# Patient Record
Sex: FEMALE | Race: WHITE | NOT HISPANIC OR LATINO | URBAN - METROPOLITAN AREA
[De-identification: names, ages, dates, MRNs, and addresses within clinical notes are randomized per-mention and may not be internally consistent; named-entity substitution may affect disease eponyms.]

---

## 2022-04-29 ENCOUNTER — EMERGENCY (EMERGENCY)
Facility: HOSPITAL | Age: 57
LOS: 0 days | Discharge: HOME | End: 2022-04-29
Attending: EMERGENCY MEDICINE | Admitting: EMERGENCY MEDICINE
Payer: COMMERCIAL

## 2022-04-29 VITALS
HEIGHT: 62 IN | DIASTOLIC BLOOD PRESSURE: 60 MMHG | OXYGEN SATURATION: 100 % | RESPIRATION RATE: 18 BRPM | HEART RATE: 92 BPM | SYSTOLIC BLOOD PRESSURE: 133 MMHG | WEIGHT: 139.99 LBS | TEMPERATURE: 98 F

## 2022-04-29 DIAGNOSIS — F41.0 PANIC DISORDER [EPISODIC PAROXYSMAL ANXIETY]: ICD-10-CM

## 2022-04-29 PROCEDURE — 99283 EMERGENCY DEPT VISIT LOW MDM: CPT

## 2022-04-29 NOTE — ED PROVIDER NOTE - NS ED ROS FT
Constitutional: (-) fever (-) malaise (-) diaphoresis (-) chills  Eyes: (-) visual changes   Cardiac: (-) chest pain  (-) palpitations (-) syncope (-) edema  Respiratory: (-) SOB (-) MAY  GI: (-) nausea (-) vomiting (-) diarrhea (-) abdominal pain  Neuro: (-) headache (-) dizziness (-) numbness/tingling to extremities B/L (-) weakness   Skin: (-) rash (-) laceration  Psychiatric: (-) hallucinations (-) SI/HI (-) intoxication    Except as documented in the HPI, all other systems are negative.

## 2022-04-29 NOTE — ED PROVIDER NOTE - PHYSICAL EXAMINATION
GENERAL: Well-nourished, Well-developed. NAD.  HEAD: No visible or palpable bumps or hematomas. No ecchymosis behind ears B/L.  Eyes: PERRLA, EOMI. No asymmetry. No nystagmus. No conjunctival injection. Non-icteric sclera.  ENMT: MMM.   Neck: Supple. FROM  CVS: Normal S1,S2. No murmurs appreciated on auscultation   RESP: Lungs clear to auscultation B/L. No wheezing, rales, or rhonchi auscultated.  Skin: Warm, Dry. No rashes or lesions. Good cap refill < 2 sec B/L.  EXT: Radial and pedal pulses present B/L. No calf tenderness or swelling B/L. No palpable cords. No pedal edema B/L.  Neuro: AA&O x 3. Sensation grossly intact. Strength 5/5 B/L. Gait within normal limits.

## 2022-04-29 NOTE — ED ADULT TRIAGE NOTE - CHIEF COMPLAINT QUOTE
pt states "I saw a child having a seizure on the bus and I thought he was going to die. I'm shaking"

## 2022-04-29 NOTE — ED PROVIDER NOTE - NSFOLLOWUPCLINICS_GEN_ALL_ED_FT
[facility]  [facility]  [address] [place] [id number]  Phone: [phone]  Fax:   Follow Up Time: 4-6 Days

## 2022-04-29 NOTE — ED PROVIDER NOTE - OBJECTIVE STATEMENT
57 yo F pmhx anxiety (takes 1mg xanax as needed) presenting to the ED for evaluation of anxiety, pt is  and on way to school one of the students had a seizure, pt states she was trying to help the child who was having a seizure and after witnessing that seizure feels "shaken up". Pt has no medical complaint, just reports she felt very upset and overwhelmed. Pt states she took 0.5mg of xanax this am. Denies any si/hi, hallucinations, chest pain, sob, vomiting.

## 2022-04-29 NOTE — ED PROVIDER NOTE - NSFOLLOWUPINSTRUCTIONS_ED_ALL_ED_FT
Anxiety    Generalized anxiety disorder (GOPI) is a mental disorder. It is defined as anxiety that is not necessarily related to specific events or activities or is out of proportion to said events. Symptoms include restlessness, fatigue, difficulty concentrations, irritability and difficulty concentrating. It interferes with life functions, including relationships, work, and school. If you were started on a medication make sure to take exactly as prescribed and follow up with a psychiatrist.    SEEK IMMEDIATE MEDICAL CARE IF YOU HAVE THE FOLLOWING SYMPTOMS: thoughts about hurting killing yourself, thoughts about hurting or killing somebody else, hallucinations, or worsening depression. Panic Attack  A panic attack is a sudden episode of severe anxiety, fear, or discomfort that causes physical and emotional symptoms. The attack may be in response to something frightening, or it may occur for no known reason.    Symptoms of a panic attack can be similar to symptoms of a heart attack or stroke. It is important to see your health care provider when you have a panic attack so that these conditions can be ruled out.    A panic attack is a symptom of another condition. Most panic attacks go away with treatment of the underlying problem. If you have panic attacks often, you may have a condition called panic disorder.    What are the causes?  A panic attack may be caused by:    An extreme, life-threatening situation, such as a war or natural disaster.  An anxiety disorder, such as post-traumatic stress disorder.  Depression.  Certain medical conditions, including heart problems, neurological conditions, and infections.  Certain over-the-counter and prescription medicines.  Illegal drugs that increase heart rate and blood pressure, such as methamphetamine.  Alcohol.  Supplements that increase anxiety.  Panic disorder.    What increases the risk?  You are more likely to develop this condition if:    You have an anxiety disorder.  You have another mental health condition.  You take certain medicines.  You use alcohol, illegal drugs, or other substances.  You are under extreme stress.  A life event is causing increased feelings of anxiety and depression.    What are the signs or symptoms?  A panic attack starts suddenly, usually lasts about 20 minutes, and occurs with one or more of the following:    A pounding heart.  A feeling that your heart is beating irregularly or faster than normal (palpitations).  Sweating.  Trembling or shaking.  Shortness of breath or feeling smothered.  Feeling choked.  Chest pain or discomfort.  Nausea or a strange feeling in your stomach.  Dizziness, feeling lightheaded, or feeling like you might faint.  Chills or hot flashes.  Numbness or tingling in your lips, hands, or feet.  Feeling confused, or feeling that you are not yourself.  Fear of losing control or being emotionally unstable.  Fear of dying.    How is this diagnosed?  A panic attack is diagnosed with an assessment by your health care provider. During the assessment your health care provider will ask questions about:    Your history of anxiety, depression, and panic attacks.  Your medical history.  Whether you drink alcohol, use illegal drugs, take supplements, or take medicines. Be honest about your substance use.    Your health care provider may also:    Order blood tests or other kinds of tests to rule out serious medical conditions.  Refer you to a mental health professional for further evaluation.    How is this treated?  Treatment depends on the cause of the panic attack:    If the cause is a medical problem, your health care provider will either treat that problem or refer you to a specialist.  If the cause is emotional, you may be given anti-anxiety medicines or referred to a counselor. These medicines may reduce how often attacks happen, reduce how severe the attacks are, and lower anxiety.  If the cause is a medicine, your health care provider may tell you to stop the medicine, change your dose, or take a different medicine.  If the cause is a drug, treatment may involve letting the drug wear off and taking medicine to help the drug leave your body or to counteract its effects. Attacks caused by drug abuse may continue even if you stop using the drug.    Follow these instructions at home:  Take over-the-counter and prescription medicines only as told by your health care provider.  If you feel anxious, limit your caffeine intake.  Take good care of your physical and mental health by:    Eating a balanced diet that includes plenty of fresh fruits and vegetables, whole grains, lean meats, and low-fat dairy.  Getting plenty of rest. Try to get 7–8 hours of uninterrupted sleep each night.  Exercising regularly. Try to get 30 minutes of physical activity at least 5 days a week.  Not smoking. Talk to your health care provider if you need help quitting.  Limiting alcohol intake to no more than 1 drink a day for nonpregnant women and 2 drinks a day for men. One drink equals 12 oz of beer, 5 oz of wine, or 1½ oz of hard liquor.    Keep all follow-up visits as told by your health care provider. This is important. Panic attacks may have underlying physical or emotional problems that take time to accurately diagnose.  Contact a health care provider if:  Your symptoms do not improve, or they get worse.  You are not able to take your medicine as prescribed because of side effects.  Get help right away if:  You have serious thoughts about hurting yourself or others.  You have symptoms of a panic attack. Do not drive yourself to the hospital. Have someone else drive you or call an ambulance.  If you ever feel like you may hurt yourself or others, or you have thoughts about taking your own life, get help right away. You can go to your nearest emergency department or call:     Your local emergency services (911 in the [place]   A suicide crisis helpline, such as the National Suicide Prevention Lifeline at 1-292.783.3015. This is open 24 hours a day.     Summary  A panic attack is a sign of a serious health or mental health condition. Get help right away. Do not drive yourself to the hospital. Have someone else drive you or call an ambulance.  Always see a health care provider to have the reasons for the panic attack correctly diagnosed.  If your panic attack was caused by a physical problem, follow your health care provider's suggestions for medicine, referral to a specialist, and lifestyle changes.  If your panic attack was caused by an emotional problem, follow through with counseling from a qualified mental health specialist.  If you feel like you may hurt yourself or others, call 911 and get help right away.  This information is not intended to replace advice given to you by your health care provider. Make sure you discuss any questions you have with your health care provider.

## 2022-04-29 NOTE — ED PROVIDER NOTE - CLINICAL SUMMARY MEDICAL DECISION MAKING FREE TEXT BOX
Patient is a  and presented s/p witnessing a student have a seizure on her bus [employer]. States she was shaken up from the experience and it triggered her to have what she describes as a panic attack. Per patient, this episode was the exact same as prior panic attacks. Otherwise on arrival patient afebrile, HD stable, neurovascularly intact. No SI/HI/hallucinations or any current complaints. States symptoms have since resolved. Offered xanax and zofran as patient had endorsed anxiety and nausea associated with anxiety but patient declined and is amendable to discharge. Patient ambulatory without difficulty in ED and states she has good outpatient psych follow up. Agrees to return to ED for any new or worsening symptoms.

## 2022-04-29 NOTE — ED PROVIDER NOTE - PATIENT PORTAL LINK FT
You can access the FollowMyHealth Patient Portal offered by [facility] by registering at the following website: [email or web address]. By joining [employer]’s FollowMyHealth portal, you will also be able to view your health information using other applications (apps) compatible with our system.

## 2022-04-29 NOTE — ED ADULT NURSE NOTE - NSIMPLEMENTINTERV_GEN_ALL_ED
Implemented All Universal Safety Interventions:  [place] to call system. Call bell, personal items and telephone within reach. Instruct patient to call for assistance. Room bathroom lighting operational. Non-slip footwear when patient is off stretcher. Physically safe environment: no spills, clutter or unnecessary equipment. Stretcher in lowest position, wheels locked, appropriate side rails in place.

## 2023-04-06 NOTE — ED ADULT NURSE NOTE - NS_BH TRG QUESTION6_ED_ALL_ED
Judgment normal.       DIAGNOSTIC RESULTS   Labs:  Results for orders placed or performed during the hospital encounter of 04/06/23   CBC with Auto Differential   Result Value Ref Range    WBC 6.9 4.8 - 10.8 thou/mm3    RBC 4.52 4.20 - 5.40 mill/mm3    Hemoglobin 13.3 12.0 - 16.0 gm/dl    Hematocrit 37.6 37.0 - 47.0 %    MCV 83 81 - 99 fL    MCH 29.4 27.0 - 31.0 pg    MCHC 35.4 33.0 - 37.0 gm/dl    RDW 12.5 11.5 - 14.5 %    Platelets 615 649 - 782 thou/mm3    MPV 9.1 7.4 - 10.4 fL   POC Basic Metabol Panel without Calcium   Result Value Ref Range    Sodium, Whole Blood 138 138 - 146 meq/l    Potassium, Whole Blood 3.7 3.5 - 4.9 meq/l    Chloride, Whole Blood 106 98 - 109 meq/l    Total CO2, Venous 22 (L) 23 - 33 meq/l    Glucose, Whole Blood 90 70 - 108 mg/dl    BUN, WHOLE BLOOD 7 (L) 8 - 26 mg/dl    CREATININE, WHOLE BLOOD 0.5 0.5 - 1.2 mg/dl   Urinalysis   Result Value Ref Range    Glucose, Ur Negative NEGATIVE mg/dl    Bilirubin Urine Negative NEGATIVE    Ketones, Urine Negative NEGATIVE    Specific Gravity, UA <=1.005 1.002 - 1.030    Blood, Urine Small (A) NEGATIVE    pH, UA 6.00 5.0 - 9.0    Protein, UA Negative NEGATIVE mg/dl    Urobilinogen, Urine 0.20 0.2 - 1.0 eu/dl    Nitrite, Urine Negative NEGATIVE    Leukocyte Esterase, Urine Negative NEGATIVE    Color, UA Yellow STRAW-YELLOW    Character, Urine Clear CLEAR-SL CLOUD   Pregnancy, Urine   Result Value Ref Range    Pregnancy, Urine NEGATIVE NEGATIVE   Mononucleosis Screen   Result Value Ref Range    MONONUCLEOSIS ANTIBODY NEGATIVE NEGATIVE   Glomerular Filtration Rate, Estimated   Result Value Ref Range    Est, Glom Filt Rate >60 >60 ml/min/1.73m2       IMAGING:  XR CHEST (2 VW)   Final Result   1. No acute cardiopulmonary finding. **This report has been created using voice recognition software. It may contain minor errors which are inherent in voice recognition technology. **      Final report electronically signed by Dr Ilean Bernheim on
No